# Patient Record
Sex: MALE | Race: WHITE | RURAL
[De-identification: names, ages, dates, MRNs, and addresses within clinical notes are randomized per-mention and may not be internally consistent; named-entity substitution may affect disease eponyms.]

---

## 2021-06-14 ENCOUNTER — OFFICE VISIT (OUTPATIENT)
Dept: FAMILY MEDICINE CLINIC | Age: 31
End: 2021-06-14
Payer: COMMERCIAL

## 2021-06-14 VITALS
TEMPERATURE: 98.1 F | OXYGEN SATURATION: 98 % | HEART RATE: 69 BPM | WEIGHT: 177.5 LBS | SYSTOLIC BLOOD PRESSURE: 122 MMHG | DIASTOLIC BLOOD PRESSURE: 76 MMHG

## 2021-06-14 DIAGNOSIS — L23.7 POISON IVY: Primary | ICD-10-CM

## 2021-06-14 PROCEDURE — 99202 OFFICE O/P NEW SF 15 MIN: CPT | Performed by: NURSE PRACTITIONER

## 2021-06-14 RX ORDER — METHYLPREDNISOLONE ACETATE 80 MG/ML
80 INJECTION, SUSPENSION INTRA-ARTICULAR; INTRALESIONAL; INTRAMUSCULAR; SOFT TISSUE ONCE
Status: COMPLETED | OUTPATIENT
Start: 2021-06-14 | End: 2021-06-23

## 2021-06-14 RX ORDER — PREDNISONE 10 MG/1
TABLET ORAL
Qty: 1 EACH | Refills: 0 | Status: SHIPPED | OUTPATIENT
Start: 2021-06-14

## 2021-06-14 NOTE — PROGRESS NOTES
SUBJECTIVE:    Serena Suazo is a 32 y.o. male    Pt c/o poison ivy. Pt c/o itching all over. Pt request a steroid injection. 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 Rash  This is a new problem. The current episode started yesterday. The problem has been gradually worsening since onset. The rash is diffuse. The rash is characterized by itchiness and redness. He was exposed to plant contact (poison ivy). Pertinent negatives include no congestion, cough, diarrhea, facial edema, fatigue, shortness of breath, sore throat or vomiting. Past treatments include nothing. Chief Complaint   Patient presents with   [facility]        Review of Systems   Constitutional: Negative. Negative for fatigue. HENT: Negative for congestion and sore throat. Respiratory: Negative for cough and shortness of breath. Cardiovascular: Negative for chest pain. Gastrointestinal: Negative for abdominal pain, diarrhea, nausea and vomiting. Skin: Positive for rash (diffuse, forehead, arms, legs, torso). OBJECTIVE:    /76   Pulse 69   Temp 98.1 °F (36.7 °C) (Temporal)   Wt 177 lb 8 oz (80.5 kg)   SpO2 98%    Physical Exam  Vitals and nursing note reviewed. Constitutional:       Appearance: He is well-developed. Neck:      Thyroid: No thyromegaly. Vascular: No carotid bruit. Cardiovascular:      Rate and Rhythm: Normal rate and regular rhythm. Heart sounds: Normal heart sounds. No murmur heard. Pulmonary:      Effort: Pulmonary effort is normal.      Breath sounds: Normal breath sounds. Skin:     General: Skin is warm and dry. Comments: Scattered erythematous papules bilateral hands, arms, neck and forehead. Neurological:      Mental Status: He is alert and oriented to person, place, and time. Psychiatric:         Behavior: Behavior normal.         ASSESSMENT/PLAN:   Larry Storey was seen today for poison ivy.     Diagnoses and all orders for this visit:    Poison ivy  -     methylPREDNISolone acetate (DEPO-MEDROL) injection 80 mg  -     predniSONE 10 MG (21) TBPK; Take 6-5-4-3-2-1 stop        Return if symptoms worsen or fail to improve. No current outpatient medications on file prior to visit. No current facility-administered medications on file prior to visit.

## 2021-06-15 ASSESSMENT — ENCOUNTER SYMPTOMS
VOMITING: 0
SORE THROAT: 0
ABDOMINAL PAIN: 0
NAUSEA: 0
DIARRHEA: 0
SHORTNESS OF BREATH: 0
COUGH: 0

## 2021-06-23 PROCEDURE — 96372 THER/PROPH/DIAG INJ SC/IM: CPT | Performed by: NURSE PRACTITIONER

## 2021-06-23 RX ADMIN — METHYLPREDNISOLONE ACETATE 80 MG: 80 INJECTION, SUSPENSION INTRA-ARTICULAR; INTRALESIONAL; INTRAMUSCULAR; SOFT TISSUE at 13:59

## 2021-06-23 NOTE — PROGRESS NOTES
Administrations This Visit     methylPREDNISolone acetate (DEPO-MEDROL) injection 80 mg     Admin Date  06/14/2021  13:59 Action  Given Dose  80 mg Route  Intramuscular Site  Abdomen LLQ (Left Lower Quadrant) Administered By  Violeta Busch MA    Ordering Provider: HSELLEY Aviles NP [address]: [id number]    Lot#: [id number]    : [employer]    Patient Supplied?: No